# Patient Record
Sex: FEMALE | URBAN - METROPOLITAN AREA
[De-identification: names, ages, dates, MRNs, and addresses within clinical notes are randomized per-mention and may not be internally consistent; named-entity substitution may affect disease eponyms.]

---

## 2018-07-16 ENCOUNTER — OFFICE VISIT (OUTPATIENT)
Dept: URBAN - METROPOLITAN AREA CLINIC 23 | Facility: CLINIC | Age: 78
End: 2018-07-16
Payer: MEDICARE

## 2018-07-16 DIAGNOSIS — H43.813 VITREOUS DEGENERATION, BILATERAL: Primary | ICD-10-CM

## 2018-07-16 PROCEDURE — 92014 COMPRE OPH EXAM EST PT 1/>: CPT | Performed by: OPTOMETRIST

## 2018-07-16 ASSESSMENT — INTRAOCULAR PRESSURE
OS: 14
OD: 13

## 2018-07-16 NOTE — IMPRESSION/PLAN
Impression: Other secondary cataract, right eye: H26.491. Plan: Discussed diagnosis in detail with patient. Reassured patient of current condition and treatment. Will continue to observe condition and or symptoms. Statement Selected

## 2018-07-16 NOTE — IMPRESSION/PLAN
Impression: Vitreous degeneration, bilateral: H43.813. Plan: Discussed diagnosis in detail with patient. Reassured patient of current condition and treatment. Will continue to observe condition and or symptoms. Discussed signs and symptoms of PVD/floaters. Discussed signs and symptoms of retinal detachment.

## 2019-08-07 ENCOUNTER — OFFICE VISIT (OUTPATIENT)
Dept: URBAN - METROPOLITAN AREA CLINIC 23 | Facility: CLINIC | Age: 79
End: 2019-08-07
Payer: MEDICARE

## 2019-08-07 DIAGNOSIS — H26.491 OTHER SECONDARY CATARACT OF RIGHT EYE: ICD-10-CM

## 2019-08-07 PROCEDURE — 92014 COMPRE OPH EXAM EST PT 1/>: CPT | Performed by: OPTOMETRIST

## 2019-08-07 ASSESSMENT — VISUAL ACUITY
OD: 20/40
OS: 20/20

## 2019-08-07 ASSESSMENT — KERATOMETRY
OS: 43.25
OD: 42.38

## 2019-08-07 ASSESSMENT — INTRAOCULAR PRESSURE
OS: 14
OD: 14

## 2019-08-07 NOTE — IMPRESSION/PLAN
Impression: Other secondary cataract of right eye: H26.491. Condition: quality of life issue. Symptoms: may improve with surgery. Plan: PCO accounts for some of the patient's complaints. Patient understands changing glasses will not significantly improve vision. Patient willing to have YAG Capsulotomy. Refer to cataract surgeon for YAG Capsulotomy evaluation.

## 2019-08-07 NOTE — IMPRESSION/PLAN
Impression: Vitreous degeneration, bilateral: H43.813. Plan: Discussed diagnosis in detail with patient. No treatment is required at this time. Discussed risks of progression. Discussed signs and symptoms of PVD/floaters. Discussed signs and symptoms of retinal detachment. Will continue to observe condition and or symptoms. Call if [address] worsens.